# Patient Record
Sex: FEMALE | Race: WHITE
[De-identification: names, ages, dates, MRNs, and addresses within clinical notes are randomized per-mention and may not be internally consistent; named-entity substitution may affect disease eponyms.]

---

## 2019-07-28 ENCOUNTER — HOSPITAL ENCOUNTER (EMERGENCY)
Dept: HOSPITAL 62 - ER | Age: 39
Discharge: HOME | End: 2019-07-28
Payer: COMMERCIAL

## 2019-07-28 VITALS — SYSTOLIC BLOOD PRESSURE: 124 MMHG | DIASTOLIC BLOOD PRESSURE: 74 MMHG

## 2019-07-28 DIAGNOSIS — N83.202: ICD-10-CM

## 2019-07-28 DIAGNOSIS — N76.0: Primary | ICD-10-CM

## 2019-07-28 DIAGNOSIS — B96.89: ICD-10-CM

## 2019-07-28 DIAGNOSIS — Z97.5: ICD-10-CM

## 2019-07-28 DIAGNOSIS — R10.32: ICD-10-CM

## 2019-07-28 DIAGNOSIS — N39.0: ICD-10-CM

## 2019-07-28 LAB
ADD MANUAL DIFF: NO
ALBUMIN SERPL-MCNC: 4.3 G/DL (ref 3.5–5)
ALP SERPL-CCNC: 48 U/L (ref 38–126)
ALT SERPL-CCNC: 23 U/L (ref 9–52)
ANION GAP SERPL CALC-SCNC: 10 MMOL/L (ref 5–19)
APPEARANCE UR: (no result)
APTT PPP: YELLOW S
AST SERPL-CCNC: 22 U/L (ref 14–36)
BACTERIA (WET MOUNT): (no result)
BASOPHILS # BLD AUTO: 0.1 10^3/UL (ref 0–0.2)
BASOPHILS NFR BLD AUTO: 0.9 % (ref 0–2)
BILIRUB DIRECT SERPL-MCNC: 0.2 MG/DL (ref 0–0.4)
BILIRUB SERPL-MCNC: 0.7 MG/DL (ref 0.2–1.3)
BILIRUB UR QL STRIP: NEGATIVE
BUN SERPL-MCNC: 13 MG/DL (ref 7–20)
CALCIUM: 8.8 MG/DL (ref 8.4–10.2)
CHLORIDE SERPL-SCNC: 101 MMOL/L (ref 98–107)
CO2 SERPL-SCNC: 26 MMOL/L (ref 22–30)
EOSINOPHIL # BLD AUTO: 0.1 10^3/UL (ref 0–0.6)
EOSINOPHIL NFR BLD AUTO: 0.8 % (ref 0–6)
EPITHELIALS (WET MOUNT): (no result)
ERYTHROCYTE [DISTWIDTH] IN BLOOD BY AUTOMATED COUNT: 13.4 % (ref 11.5–14)
GLUCOSE SERPL-MCNC: 96 MG/DL (ref 75–110)
GLUCOSE UR STRIP-MCNC: NEGATIVE MG/DL
HCT VFR BLD CALC: 37.2 % (ref 36–47)
HGB BLD-MCNC: 12.6 G/DL (ref 12–15.5)
KETONES UR STRIP-MCNC: NEGATIVE MG/DL
LYMPHOCYTES # BLD AUTO: 1.5 10^3/UL (ref 0.5–4.7)
LYMPHOCYTES NFR BLD AUTO: 12.5 % (ref 13–45)
MCH RBC QN AUTO: 29.1 PG (ref 27–33.4)
MCHC RBC AUTO-ENTMCNC: 33.8 G/DL (ref 32–36)
MCV RBC AUTO: 86 FL (ref 80–97)
MONOCYTES # BLD AUTO: 0.8 10^3/UL (ref 0.1–1.4)
MONOCYTES NFR BLD AUTO: 6.9 % (ref 3–13)
NEUTROPHILS # BLD AUTO: 9.3 10^3/UL (ref 1.7–8.2)
NEUTS SEG NFR BLD AUTO: 78.9 % (ref 42–78)
NITRITE UR QL STRIP: NEGATIVE
PH UR STRIP: 5 [PH] (ref 5–9)
PLATELET # BLD: 265 10^3/UL (ref 150–450)
POTASSIUM SERPL-SCNC: 3.8 MMOL/L (ref 3.6–5)
PROT SERPL-MCNC: 7.1 G/DL (ref 6.3–8.2)
PROT UR STRIP-MCNC: NEGATIVE MG/DL
RBC # BLD AUTO: 4.31 10^6/UL (ref 3.72–5.28)
RBCS (WET MOUNT): (no result)
SP GR UR STRIP: 1.02
T.VAGINALIS (WET MOUNT): (no result)
TOTAL CELLS COUNTED % (AUTO): 100 %
UROBILINOGEN UR-MCNC: NEGATIVE MG/DL (ref ?–2)
WBC # BLD AUTO: 11.8 10^3/UL (ref 4–10.5)
WBCS (WET MOUNT): (no result)
YEAST (WET MOUNT): (no result)

## 2019-07-28 PROCEDURE — 80053 COMPREHEN METABOLIC PANEL: CPT

## 2019-07-28 PROCEDURE — 36415 COLL VENOUS BLD VENIPUNCTURE: CPT

## 2019-07-28 PROCEDURE — 99284 EMERGENCY DEPT VISIT MOD MDM: CPT

## 2019-07-28 PROCEDURE — 84703 CHORIONIC GONADOTROPIN ASSAY: CPT

## 2019-07-28 PROCEDURE — 93976 VASCULAR STUDY: CPT

## 2019-07-28 PROCEDURE — 85025 COMPLETE CBC W/AUTO DIFF WBC: CPT

## 2019-07-28 PROCEDURE — 87210 SMEAR WET MOUNT SALINE/INK: CPT

## 2019-07-28 PROCEDURE — 76830 TRANSVAGINAL US NON-OB: CPT

## 2019-07-28 PROCEDURE — 81001 URINALYSIS AUTO W/SCOPE: CPT

## 2019-07-28 NOTE — RADIOLOGY REPORT (SQ)
EXAM DESCRIPTION: 



US PELVIS TRANSVAGINAL



COMPLETED DATE/TME:  07/28/2019 19:20



CLINICAL HISTORY:  39 years  Female  pelvic pain



COMPARISON:  None.



TECHNIQUE: Transvaginal duplex imaging performed to evaluate the

pelvis.



FINDINGS:



Uterus measures 9.2 x 4.7 cm. There is a small amount of fluid in

the cul-de-sac.

Endometrium measures 4 mm.

Right ovary measures 3.1 x 3.8 x 5.6 cm with normal blood flow.

There is a hemorrhagic ovarian cyst which measures 4.1 x 2.5 x

3.2 cm.

This is almost certainly benign and no follow-up is recommended.

Left ovary measures 3.3 x 1.7 cm. Left ovarian hemorrhagic cyst

measures 2 x 2.3 cm. This is almost really benign and no

follow-up is recommended.

Normal blood flow to the left ovary.



IUD in place



IMPRESSION: Bilateral hemorrhagic ovarian cyst for which no

follow-up is recommended



IUD

## 2019-07-28 NOTE — ER DOCUMENT REPORT
ED Medical Screen (RME)





- General


Chief Complaint: Abdominal Pain


Stated Complaint: ABDOMINAL PAIN


Time Seen by Provider: 07/28/19 19:05


Notes: 





Patient is a 39-year-old female who presents the emergency department with a 

chief complaint of right lower abdominal pain.  Pain started yesterday.  She 

states that she may be she was having the pain due to her doing some yard work. 

Patient went to urgent care and was told that she needed to go to the emergency 

department to be checked.  Patient denies any back pain or dysuria.  Her last 

bowel movement was yesterday.  Patient has irregular periods due to having the 

Mirena.  She denies any history of ovarian cysts.





Exam: Tender left lower abdomen.





I have greeted and performed a rapid initial assessment of this patient.  A 

comprehensive ED assessment and evaluation of the patient, analysis of test 

results and completion of medical decision making process will be conducted by 

an additional ED providers.


TRAVEL OUTSIDE OF THE [place] IN LAST 30 DAYS: No





- Related Data


Allergies/Adverse Reactions: 


                                        





No Known Allergies Allergy (Verified 07/28/19 18:22)


   











Past Medical History





- Social History


Chew tobacco use (# tins/day): No


Drug Abuse: None


Renal/ Medical History: Denies: Hx Peritoneal Dialysis





Physical Exam





- Vital signs


Vitals: 





                                        











Temp Pulse Resp BP Pulse Ox


 


 99.0 F   102 H  18   134/75 H  99 


 


 07/28/19 18:26  07/28/19 18:26  07/28/19 18:26  07/28/19 18:26  07/28/19 18:26














Course





- Vital Signs


Vital signs: 





                                        











Temp Pulse Resp BP Pulse Ox


 


 99.0 F   102 H  18   134/75 H  99 


 


 07/28/19 18:26  07/28/19 18:26  07/28/19 18:26  07/28/19 18:26  07/28/19 18:26

## 2019-07-28 NOTE — ER DOCUMENT REPORT
ED GI/





- General


Chief Complaint: Abdominal Pain


Stated Complaint: ABDOMINAL PAIN


Time Seen by Provider: 07/28/19 19:05


Mode of Arrival: Ambulatory


Information source: Patient


Notes: 





Patient is a 39-year-old female presenting to the emergency department with 

chief complaint of left lower quadrant abdominal pain that started yesterday.  

Patient reports she has a Mirena.  She denies any back pain or dysuria.  She 

reports pain is a 1 or 2 out of 5.  She reports pain is increased with movement.

 She denies any nausea, vomiting or diarrhea.





TRAVEL OUTSIDE OF THE [place] IN LAST 30 DAYS: No





- Related Data


Allergies/Adverse Reactions: 


                                        





No Known Allergies Allergy (Verified 07/28/19 18:22)


   











Past Medical History





- General


Information source: Patient





- Social History


Smoking Status: Never Smoker


Chew tobacco use (# tins/day): No


Drug Abuse: None


Family History: Reviewed & Not Pertinent


Patient has suicidal ideation: No


Patient has homicidal ideation: No





- Medical History


Medical History: Negative


Renal/ Medical History: Denies: Hx Peritoneal Dialysis


Surgical Hx: Negative





- Immunizations


Immunizations up to date: Yes





Review of Systems





- Review of Systems


Constitutional: No symptoms reported


EENT: No symptoms reported


Cardiovascular: No symptoms reported


Respiratory: No symptoms reported


Gastrointestinal: Abdominal pain


Genitourinary: No symptoms reported


Female Genitourinary: No symptoms reported


Musculoskeletal: No symptoms reported


Skin: No symptoms reported


Hematologic/Lymphatic: No symptoms reported


Neurological/Psychological: No symptoms reported





Physical Exam





- Vital signs


Vitals: 


                                        











Temp Pulse Resp BP Pulse Ox


 


 99.0 F   102 H  18   134/75 H  99 


 


 07/28/19 18:26  07/28/19 18:26  07/28/19 18:26  07/28/19 18:26  07/28/19 18:26














- Notes


Notes: 





PHYSICAL EXAMINATION:





GENERAL: Well-appearing, well-nourished and in no acute distress.





HEAD: Atraumatic, normocephalic.





EYES: Pupils equal round and reactive to light, extraocular movements intact, 

conjunctiva are normal.





ENT: Nares patent, oropharynx clear without exudates.  Moist mucous membranes.





NECK: Normal range of motion, supple without lymphadenopathy





LUNGS: Breath sounds clear to auscultation bilaterally and equal.  No wheezes 

rales or rhonchi.





HEART: Regular rate and rhythm without murmurs





ABDOMEN: Soft, nondistended abdomen.  Tenderness to palpation to the left lower 

quadrant.  No guarding, no rebound.  No masses appreciated.





Female : No CVA tenderness.  Declined speculum exam.





Musculoskeletal: Normal range of motion, no pitting or edema.  No cyanosis.





NEUROLOGICAL: Cranial nerves grossly intact.  Normal speech, normal gait.  Nor

mal sensory, motor exams





PSYCH: Normal mood, normal affect.





SKIN: Warm, Dry, normal turgor, no rashes or lesions noted.





Course





- Re-evaluation


Re-evalutation: 








Laboratory











  07/28/19 07/28/19 07/28/19





  19:25 19:25 19:25


 


WBC  11.8 H  


 


RBC  4.31  


 


Hgb  12.6  


 


Hct  37.2  


 


MCV  86  


 


MCH  29.1  


 


MCHC  33.8  


 


RDW  13.4  


 


Plt Count  265  


 


Seg Neutrophils %  78.9 H  


 


Lymphocytes %  12.5 L  


 


Monocytes %  6.9  


 


Eosinophils %  0.8  


 


Basophils %  0.9  


 


Absolute Neutrophils  9.3 H  


 


Absolute Lymphocytes  1.5  


 


Absolute Monocytes  0.8  


 


Absolute Eosinophils  0.1  


 


Absolute Basophils  0.1  


 


Sodium   136.9 L 


 


Potassium   3.8 


 


Chloride   101 


 


Carbon Dioxide   26 


 


Anion Gap   10 


 


BUN   13 


 


Creatinine   0.80 


 


Est GFR ( Amer)   > 60 


 


Est GFR (Non-Af Amer)   > 60 


 


Glucose   96 


 


Calcium   8.8 


 


Total Bilirubin   0.7 


 


Direct Bilirubin   0.2 


 


Neonat Total Bilirubin   Not Reportable 


 


Neonat Direct Bilirubin   Not Reportable 


 


Neonat Indirect Bili   Not Reportable 


 


AST   22 


 


ALT   23 


 


Alkaline Phosphatase   48 


 


Total Protein   7.1 


 


Albumin   4.3 


 


Serum HCG, Qual    NEGATIVE


 


Urine Color   


 


Urine Appearance   


 


Urine pH   


 


Ur Specific Gravity   


 


Urine Protein   


 


Urine Glucose (UA)   


 


Urine Ketones   


 


Urine Blood   


 


Urine Nitrite   


 


Urine Bilirubin   


 


Urine Urobilinogen   


 


Ur Leukocyte Esterase   


 


Urine WBC (Auto)   


 


Urine RBC (Auto)   


 


Urine Bacteria (Auto)   


 


Squamous Epi Cells Auto   


 


Urine Mucus (Auto)   


 


Urine Ascorbic Acid   


 


Epi Cells (Wet Prep)   


 


Bacteria (Wet Prep)   


 


Trichomonas (Wet Prep)   


 


Vaginal WBC   


 


Vaginal RBC   


 


Vaginal Yeast   














  07/28/19 07/28/19





  19:25 22:03


 


WBC  


 


RBC  


 


Hgb  


 


Hct  


 


MCV  


 


MCH  


 


MCHC  


 


RDW  


 


Plt Count  


 


Seg Neutrophils %  


 


Lymphocytes %  


 


Monocytes %  


 


Eosinophils %  


 


Basophils %  


 


Absolute Neutrophils  


 


Absolute Lymphocytes  


 


Absolute Monocytes  


 


Absolute Eosinophils  


 


Absolute Basophils  


 


Sodium  


 


Potassium  


 


Chloride  


 


Carbon Dioxide  


 


Anion Gap  


 


BUN  


 


Creatinine  


 


Est GFR ( Amer)  


 


Est GFR (Non-Af Amer)  


 


Glucose  


 


Calcium  


 


Total Bilirubin  


 


Direct Bilirubin  


 


Neonat Total Bilirubin  


 


Neonat Direct Bilirubin  


 


Neonat Indirect Bili  


 


AST  


 


ALT  


 


Alkaline Phosphatase  


 


Total Protein  


 


Albumin  


 


Serum HCG, Qual  


 


Urine Color  YELLOW 


 


Urine Appearance  SLIGHTLY-CLOUDY 


 


Urine pH  5.0 


 


Ur Specific Gravity  1.023 


 


Urine Protein  NEGATIVE 


 


Urine Glucose (UA)  NEGATIVE 


 


Urine Ketones  NEGATIVE 


 


Urine Blood  LARGE H 


 


Urine Nitrite  NEGATIVE 


 


Urine Bilirubin  NEGATIVE 


 


Urine Urobilinogen  NEGATIVE 


 


Ur Leukocyte Esterase  MODERATE H 


 


Urine WBC (Auto)  22 


 


Urine RBC (Auto)  4 


 


Urine Bacteria (Auto)  1+ 


 


Squamous Epi Cells Auto  9 


 


Urine Mucus (Auto)  OCC 


 


Urine Ascorbic Acid  NEGATIVE 


 


Epi Cells (Wet Prep)   4+ EPITHELIALS SEEN


 


Bacteria (Wet Prep)   4+ BACTERIA SEEN


 


Trichomonas (Wet Prep)   NO TRICHOMONAS SEEN


 


Vaginal WBC   1+ WBCS SEEN


 


Vaginal RBC   FEW RBCS SEEN


 


Vaginal Yeast   NO YEAST SEEN











                                        





Transvaginal US  07/28/19 19:20


IMPRESSION: Bilateral hemorrhagic ovarian cyst for which no


follow-up is recommended


 


IUD


 


 


 


 


 








Labs as recorded above, patient will be treated for urinary tract infection and 

bacterial vaginosis.  Transvaginal ultrasound shows bilateral hemorrhagic 

ovarian cysts, this would explain patient's left lower quadrant pain.  Patient 

will be discharged home in stable condition with ED return precautions.





The patient's emergency department workup and current diagnosis were explained 

to the patient and or family.  Follow-up instructions were provided.  

Medications if prescribed were discussed. Instructions for when to return to the

emergency department including specific worrisome symptoms were discussed with 

the patient and/or family.








- Vital Signs


Vital signs: 


                                        











Temp Pulse Resp BP Pulse Ox


 


 98.8 F   95   18   124/74   99 


 


 07/28/19 22:13  07/28/19 22:13  07/28/19 22:13  07/28/19 22:13  07/28/19 22:13














- Laboratory


Result Diagrams: 


                                 07/28/19 19:25





                                 07/28/19 19:25


Laboratory results interpreted by me: 


                                        











  07/28/19 07/28/19 07/28/19





  19:25 19:25 19:25


 


WBC  11.8 H  


 


Seg Neutrophils %  78.9 H  


 


Lymphocytes %  12.5 L  


 


Absolute Neutrophils  9.3 H  


 


Sodium   136.9 L 


 


Urine Blood    LARGE H


 


Ur Leukocyte Esterase    MODERATE H














Discharge





- Discharge


Clinical Impression: 


 Bacterial vaginosis





Urinary tract infection


Qualifiers:


 Urinary tract infection type: site unspecified Hematuria presence: with 

hematuria Qualified Code(s): N39.0 - Urinary tract infection, site not specified





Ovarian cyst


Qualifiers:


 Laterality: left Qualified Code(s): N83.202 - Unspecified ovarian cyst, left 

side





Condition: Stable


Disposition: HOME, SELF-CARE


Additional Instructions: 


Ovarian Cyst


     Your examination shows the presence of an ovarian cyst.  This is a ball of 

fluid attached to the ovary.  Ovarian cysts in women of child-bearing age are 

usually innocent.  However, the cyst may cause pain when it grows or bursts.  An

innocent ovarian cyst will usually go away by itself.


     When the cyst becomes painful, you should rest.  Pain medication may be 

required.  Some women find a hot water bottle soothing.  The pain usually 

resolves within one or two days.


     After menopause, an ovarian cyst may mean a tumor, and requires more 

aggressive evaluation -- usually surgery is recommended to remove or biopsy the 

cyst.  A very large cyst requires evaluation at any age. Most cysts (even the 

innocent ones) require follow-up examination.


     Call the doctor or return at any time if the pain increases significantly, 

if you become faint, or if you experience vaginal bleeding.





Vaginosis, Bacterial


     Your exam shows you have bacterial vaginosis. This condition is due to an 

overgrowth of bacteria in the vagina. Symptoms may include vaginal itching or 

pain, a smelly discharge, and sometimes burning with urination. Normally this is

not transmitted by sexual contact.


     Vaginosis can be treated with oral or topical antibiotics. Metronidazole 

(Flagyl) pills are usually effective. Topical vaginal creams include Cleocin and

Metro-Gel. You should avoid sexual contact until your symptoms are all better.


     Call the doctor if you develop pelvic pain, fever, or problems with 

urination, or if you don't improve as expected.





Urinary Tract Infection


     Your evaluation indicates that you have a urinary tract infection. This is 

due to germs growing in the bladder.  This is a common problem.


     This infection usually responds quickly to antibiotics.  Your antibiotic 

should be taken exactly as prescribed.  Drink plenty of fluids -- three to four 

quarts a day.


     Occasionally, a bladder anesthetic will be prescribed to help stop the f

eeling of urgency until the antibiotic has a chance to clear the infection.  

This may cause your urine to be dark orange.


     Certain urine infections require a culture.  If the doctor obtained a cul

ture, the results will be back in two days.  You should call to see if a change 

in treatment is needed.


     A repeat urinalysis after you finish treatment is often recommended.  The 

physician will let you know if further testing is required.


     Call the doctor if you develop fever, chills, flank pain, inability to 

urinate, or blood in the urine.





Please take medications as prescribed.  Try to take the antibiotics after eating

some food.  Absolutely no alcohol intake when on the Flagyl.  A copy of the 

ultrasound report has been given to you.  Return to the emergency department 

with worsening abdominal pain, development of fever or persistent vomiting.











Prescriptions: 


Cephalexin [Cephalexin 500 MG Tablet] 1 tab PO BID #14 tablet


Metronidazole [Flagyl 500 mg Tablet] 500 mg PO BID #14 tablet


Ondansetron [Zofran Odt 4 mg Tablet] 1 - 2 tab PO Q4H PRN #15 tab.rapdis


 PRN Reason: For Nausea/Vomiting